# Patient Record
Sex: FEMALE | Race: OTHER
[De-identification: names, ages, dates, MRNs, and addresses within clinical notes are randomized per-mention and may not be internally consistent; named-entity substitution may affect disease eponyms.]

---
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
## 2023-06-17 ENCOUNTER — APPOINTMENT (OUTPATIENT)
Dept: PEDIATRICS | Facility: CLINIC | Age: 18
End: 2023-06-17
Payer: COMMERCIAL

## 2023-06-17 VITALS — WEIGHT: 182.06 LBS | TEMPERATURE: 98.5 F

## 2023-06-17 DIAGNOSIS — J40 BRONCHITIS, NOT SPECIFIED AS ACUTE OR CHRONIC: ICD-10-CM

## 2023-06-17 DIAGNOSIS — L23.9 ALLERGIC CONTACT DERMATITIS, UNSPECIFIED CAUSE: ICD-10-CM

## 2023-06-17 PROCEDURE — 99214 OFFICE O/P EST MOD 30 MIN: CPT

## 2023-06-17 RX ORDER — MOMETASONE FUROATE 1 MG/G
0.1 CREAM TOPICAL TWICE DAILY
Qty: 1 | Refills: 2 | Status: ACTIVE | COMMUNITY
Start: 2023-06-17 | End: 1900-01-01

## 2023-06-17 RX ORDER — AZITHROMYCIN 250 MG/1
250 TABLET, FILM COATED ORAL
Qty: 1 | Refills: 0 | Status: ACTIVE | COMMUNITY
Start: 2023-06-17 | End: 1900-01-01

## 2023-06-17 RX ORDER — MUPIROCIN 20 MG/G
2 OINTMENT TOPICAL 3 TIMES DAILY
Qty: 1 | Refills: 0 | Status: ACTIVE | COMMUNITY
Start: 2023-06-17 | End: 1900-01-01

## 2023-06-20 PROBLEM — J40 BRONCHITIS: Status: ACTIVE | Noted: 2023-06-20

## 2023-06-20 NOTE — DISCUSSION/SUMMARY
[FreeTextEntry1] : Start antibiotic treatments if worsens or SOB or chest pain to call back\par if rash worsens to call back and follow up with derm apply ointments at this time

## 2023-06-20 NOTE — PHYSICAL EXAM
[Clear Rhinorrhea] : clear rhinorrhea [Rales] : rales [NL] : moves all extremities x4, warm, well perfused x4 [Erythematous] : erythematous [Wheals] : wheals

## 2023-06-20 NOTE — HISTORY OF PRESENT ILLNESS
[FreeTextEntry6] : STATES THAT  has been coughing for the last few days but has worsened over night with a low grade fever and feels slight chest pressure as well no distress also rash on abdomen as well after being away